# Patient Record
Sex: MALE
[De-identification: names, ages, dates, MRNs, and addresses within clinical notes are randomized per-mention and may not be internally consistent; named-entity substitution may affect disease eponyms.]

---

## 2021-09-15 ENCOUNTER — NON-APPOINTMENT (OUTPATIENT)
Age: 66
End: 2021-09-15

## 2021-09-15 ENCOUNTER — APPOINTMENT (OUTPATIENT)
Dept: HEART AND VASCULAR | Facility: CLINIC | Age: 66
End: 2021-09-15

## 2021-09-15 VITALS
BODY MASS INDEX: 24.4 KG/M2 | SYSTOLIC BLOOD PRESSURE: 132 MMHG | DIASTOLIC BLOOD PRESSURE: 89 MMHG | HEIGHT: 68 IN | WEIGHT: 161 LBS | HEART RATE: 66 BPM | OXYGEN SATURATION: 99 %

## 2021-09-15 DIAGNOSIS — I10 ESSENTIAL (PRIMARY) HYPERTENSION: ICD-10-CM

## 2021-09-15 DIAGNOSIS — E78.5 HYPERLIPIDEMIA, UNSPECIFIED: ICD-10-CM

## 2021-09-15 DIAGNOSIS — Z87.891 PERSONAL HISTORY OF NICOTINE DEPENDENCE: ICD-10-CM

## 2021-09-15 DIAGNOSIS — Z86.39 PERSONAL HISTORY OF OTHER ENDOCRINE, NUTRITIONAL AND METABOLIC DISEASE: ICD-10-CM

## 2021-09-15 DIAGNOSIS — I63.9 CEREBRAL INFARCTION, UNSPECIFIED: ICD-10-CM

## 2021-09-15 PROBLEM — Z00.00 ENCOUNTER FOR PREVENTIVE HEALTH EXAMINATION: Status: ACTIVE | Noted: 2021-09-15

## 2021-09-15 RX ORDER — METFORMIN ER 750 MG 750 MG/1
750 TABLET ORAL DAILY
Qty: 90 | Refills: 0 | Status: ACTIVE | COMMUNITY
Start: 2021-09-15

## 2021-09-15 RX ORDER — ERGOCALCIFEROL 1.25 MG/1
1.25 MG CAPSULE, LIQUID FILLED ORAL
Qty: 4 | Refills: 3 | Status: ACTIVE | COMMUNITY
Start: 2021-09-15

## 2021-09-15 RX ORDER — OMEPRAZOLE 40 MG/1
40 CAPSULE, DELAYED RELEASE ORAL
Qty: 90 | Refills: 3 | Status: ACTIVE | COMMUNITY
Start: 2021-09-15

## 2021-09-15 RX ORDER — ATORVASTATIN CALCIUM 40 MG/1
40 TABLET, FILM COATED ORAL DAILY
Qty: 90 | Refills: 3 | Status: ACTIVE | COMMUNITY
Start: 2021-09-15

## 2021-09-15 RX ORDER — BLOOD-GLUCOSE METER
KIT MISCELLANEOUS
Qty: 1 | Refills: 0 | Status: ACTIVE | COMMUNITY
Start: 2021-09-15 | End: 1900-01-01

## 2021-09-15 RX ORDER — ASPIRIN 81 MG/1
81 TABLET, CHEWABLE ORAL DAILY
Qty: 90 | Refills: 3 | Status: ACTIVE | COMMUNITY
Start: 2021-09-15

## 2021-09-20 NOTE — PHYSICAL EXAM
[No Murmur] : no murmur [No Rub] : no rub [No Gallop] : no gallop [No Edema] : no edema [No Cyanosis] : no cyanosis [Normal Radial B/L] : normal radial B/L [Normal PT B/L] : normal PT B/L [Normal] : alert and oriented, normal memory [Alert and Oriented] : alert and oriented [Normal memory] : normal memory [Cognitive Impairment] : cognitive impairment [de-identified] : Irregular. [de-identified] : Good inspiratory Effort, Dry Crackles.

## 2021-09-20 NOTE — HISTORY OF PRESENT ILLNESS
[FreeTextEntry1] : 64yo M with HTN, HLD T2DM, recently diagnosed Acute right temporal CVA at [facility] in the setting of pAF. He presented there with dizziness and nausea. Since DC (Signed himself AMA from there on 09/13/21) denying any new complains. He has been exercising at his house on the Treadmill for 30 mins without any complains. No symptoms at all. \par \par Diagnostics During [facility] Admission (09/2021):\par Lipids: LDL 56 HDL 28, Hb 13, Cr 0.96\par EKGs: Atrial Fibrillation (HR ranging from 41-79) on multiple ekgs\par TTE (09/2021): EF 60%, no PFO. No VHD\par CTH: Right acute temporal Infarct\par MRA/MRI Head-neck: Right sub acute MCA infarct involving the temporal lobe with severe P2 MCA stenosis

## 2021-09-20 NOTE — CARDIOLOGY SUMMARY
[de-identified] : \par 09/2021: EKGs: Atrial Fibrillation (HR ranging from 41-79) on multiple ekgs\par  [de-identified] : 09/2021: TTE (09/2021): EF 60%, no PFO. No VHD

## 2021-09-20 NOTE — ASSESSMENT
[FreeTextEntry1] : 64yo M with HTN, HLD T2DM, recently diagnosed Acute right temporal CVA at [facility] in the setting of pAF. \par \par 1) CAD: No symptoms, given the high risk\par - CCTA to r/o CAD\par - c.w Aspirin and Lipitor\par \par 2) Acute CVA: No focal/residual deficits\par - c/w Aspirin, Lipitor\par - Will FU w neurologist at [facility]\par \par 3) Atrial Fibrillation: Rate controlled at this time. [id number] 5\par - Will hold Eliquis for 6 weeks to ensure no hemorrhagic conversion\par - c/w Aspirin\par - No rate control needed, given the intermittent bradycardia\par \par FU in 6 weeks after CCTA